# Patient Record
Sex: FEMALE | Race: WHITE | NOT HISPANIC OR LATINO | Employment: OTHER | URBAN - METROPOLITAN AREA
[De-identification: names, ages, dates, MRNs, and addresses within clinical notes are randomized per-mention and may not be internally consistent; named-entity substitution may affect disease eponyms.]

---

## 2020-02-19 ENCOUNTER — OFFICE VISIT (OUTPATIENT)
Dept: URGENT CARE | Age: 75
End: 2020-02-19
Payer: COMMERCIAL

## 2020-02-19 VITALS
HEIGHT: 62 IN | TEMPERATURE: 98.1 F | SYSTOLIC BLOOD PRESSURE: 130 MMHG | DIASTOLIC BLOOD PRESSURE: 58 MMHG | RESPIRATION RATE: 18 BRPM | WEIGHT: 182.4 LBS | HEART RATE: 67 BPM | OXYGEN SATURATION: 100 % | BODY MASS INDEX: 33.57 KG/M2

## 2020-02-19 DIAGNOSIS — H57.12 LEFT EYE PAIN: Primary | ICD-10-CM

## 2020-02-19 DIAGNOSIS — H11.32 SUBCONJUNCTIVAL HEMATOMA, LEFT: ICD-10-CM

## 2020-02-19 PROCEDURE — 99203 OFFICE O/P NEW LOW 30 MIN: CPT | Performed by: NURSE PRACTITIONER

## 2020-02-19 RX ORDER — ASPIRIN 81 MG/1
81 TABLET, CHEWABLE ORAL DAILY
COMMUNITY

## 2020-02-19 RX ORDER — TOBRAMYCIN 3 MG/ML
1 SOLUTION/ DROPS OPHTHALMIC
Qty: 1 BOTTLE | Refills: 0 | Status: SHIPPED | OUTPATIENT
Start: 2020-02-19 | End: 2020-02-26

## 2020-02-19 RX ORDER — NAPROXEN SODIUM 220 MG
TABLET ORAL
COMMUNITY

## 2020-02-19 NOTE — PATIENT INSTRUCTIONS
Take meds as directed   If worse go to the ER or see your eye dr        Subconjunctival Hemorrhage   WHAT YOU NEED TO KNOW:   A subconjunctival hemorrhage is when blood collects under the conjunctiva in your eye  The conjunctiva is the clear lining that covers the white part of your eye  The blood comes from broken blood vessels under the conjunctiva  DISCHARGE INSTRUCTIONS:   Care for your eye:   · Cold or warm compress:  Use a cold pack during the first 24 hours  Ask how often to apply it and for how long each time  After the first 24 hours, apply a warm pack on your eye  Do this 3 times each day for about 10 to 15 minutes each time  · Eyedrops: You may need artificial tears to keep your eye moist  Use the drops as directed  Follow up with your healthcare provider or eye specialist as directed:  Write down your questions so you remember to ask them during your visits  Contact your healthcare provider or eye specialist if:   · The redness in your eye has not gone away after 3 weeks  · You have another subconjunctival hemorrhage  · You have subconjunctival hemorrhages in both eyes  · You have questions or concerns about your condition or care  Return to the emergency department if:   · You have eye pain or sensitivity to light  · Your vision changes  · You have white or yellow discharge from your eye  © 2017 [facility] [facility] Information is for End User's use only and may not be sold, redistributed or otherwise used for commercial purposes  All illustrations and images included in CareNotes® are the copyrighted property of [employer]  or [employer]  The above information is an  only  It is not intended as medical advice for individual conditions or treatments  Talk to your doctor, nurse or pharmacist before following any medical regimen to see if it is safe and effective for you

## 2020-02-19 NOTE — PROGRESS NOTES
NAME: Ainsley Sacks is a 66 y o  female  : 1945    MRN: [id number]    /58   Pulse 67   Temp 98 1 °F (36 7 °C) (Tympanic)   Resp 18   Ht 5' 2" (1 575 m)   Wt 82 7 kg (182 lb 6 4 oz)   SpO2 100%   BMI 33 36 kg/m²     Assessment and Plan   Left eye pain [H57 12]  1  Left eye pain  tobramycin (TOBREX) 0 3 % SOLN   2  Subconjunctival hematoma, left  tobramycin (TOBREX) 0 3 % SOLN       Park Davenport was seen today for eye problem  Diagnoses and all orders for this visit:    Left eye pain  -     tobramycin (TOBREX) 0 3 % SOLN; Administer 1 drop to the right eye every 4 (four) hours while awake for 7 days    Subconjunctival hematoma, left  -     tobramycin (TOBREX) 0 3 % SOLN; Administer 1 drop to the right eye every 4 (four) hours while awake for 7 days        Patient Instructions   Patient Instructions     Take meds as directed   If worse go to the ER or see your eye dr        Subconjunctival Hemorrhage   WHAT YOU NEED TO KNOW:   A subconjunctival hemorrhage is when blood collects under the conjunctiva in your eye  The conjunctiva is the clear lining that covers the white part of your eye  The blood comes from broken blood vessels under the conjunctiva  DISCHARGE INSTRUCTIONS:   Care for your eye:   · Cold or warm compress:  Use a cold pack during the first 24 hours  Ask how often to apply it and for how long each time  After the first 24 hours, apply a warm pack on your eye  Do this 3 times each day for about 10 to 15 minutes each time  · Eyedrops: You may need artificial tears to keep your eye moist  Use the drops as directed  Follow up with your healthcare provider or eye specialist as directed:  Write down your questions so you remember to ask them during your visits  Contact your healthcare provider or eye specialist if:   · The redness in your eye has not gone away after 3 weeks  · You have another subconjunctival hemorrhage      · You have subconjunctival hemorrhages in both eyes     · You have questions or concerns about your condition or care  Return to the emergency department if:   · You have eye pain or sensitivity to light  · Your vision changes  · You have white or yellow discharge from your eye  © 2017 [address] [employer] Information is for End User's use only and may not be sold, redistributed or otherwise used for commercial purposes  All illustrations and images included in CareNotes® are the copyrighted property of [employer]  or [employer]  The above information is an  only  It is not intended as medical advice for individual conditions or treatments  Talk to your doctor, nurse or pharmacist before following any medical regimen to see if it is safe and effective for you  Proceed to ER if symptoms worsen  Chief Complaint     Chief Complaint   Patient presents with    Eye Problem     Left eye symptoms present for 48 hours, presents with scleral erythema and periorcular edema, states she had increased drainage that was white in color  Crusted when she woke up this AM   States it is not painful just irrirated  History of Present Illness     66-year-old female here today with her  at bedside  She comes in today with 2 days of left eye pain erythema to the sclera and watery eye  Patient recently moved to the area on Sunday from [facility] and was exposed to a lot of dust   Her left eye is itchy in nature and has been sent rubbing it for the past few days  Today she woke up without crusted shut due to the amount of drainage from the eye  Patient has had lens implants over 12 years ago and has had issues since  Review of Systems   Review of Systems   Eyes: Positive for discharge, redness and itching  Negative for photophobia and visual disturbance  Respiratory: Positive for cough  Neurological: Negative for dizziness, syncope and headaches           Current Medications       Current Outpatient Medications:     aspirin 81 mg chewable tablet, Chew 81 mg daily, Disp: , Rfl:     naproxen sodium (Aleve) 220 MG tablet, Take by mouth, Disp: , Rfl:     tobramycin (TOBREX) 0 3 % SOLN, Administer 1 drop to the right eye every 4 (four) hours while awake for 7 days, Disp: 1 Bottle, Rfl: 0    Current Allergies     Allergies as of 02/19/2020 - Reviewed 02/19/2020   Allergen Reaction Noted    Azithromycin  09/21/2015    Diclofenac  09/21/2015    Morphine  09/21/2015    Penicillins  09/21/2015    Sulfa antibiotics  09/21/2015    Warfarin  09/21/2015    Vancomycin Rash 02/19/2020              Past Medical History:   Diagnosis Date    Atrial fibrillation ([facility] [address] )     watchman placement       Past Surgical History:   Procedure Laterality Date    CATARACT EXTRACTION, BILATERAL      EYE SURGERY      Left eye S/P Yag laser, right eye lens implant    HYSTERECTOMY      JOINT REPLACEMENT      left hip, right knee       History reviewed  No pertinent family history  Medications have been verified  The following portions of the patient's history were reviewed and updated as appropriate: allergies, current medications, past family history, past medical history, past social history, past surgical history and problem list     Objective   /58   Pulse 67   Temp 98 1 °F (36 7 °C) (Tympanic)   Resp 18   Ht 5' 2" (1 575 m)   Wt 82 7 kg (182 lb 6 4 oz)   SpO2 100%   BMI 33 36 kg/m²      Physical Exam     Physical Exam   Constitutional: She appears well-developed and well-nourished  No distress  Eyes: Pupils are equal, round, and reactive to light  EOM and lids are normal  Left conjunctiva has a hemorrhage             MANJU Edwards

## 2020-03-02 ENCOUNTER — OFFICE VISIT (OUTPATIENT)
Dept: URGENT CARE | Age: 75
End: 2020-03-02
Payer: COMMERCIAL

## 2020-03-02 VITALS
HEIGHT: 62 IN | RESPIRATION RATE: 18 BRPM | TEMPERATURE: 98 F | BODY MASS INDEX: 33.45 KG/M2 | DIASTOLIC BLOOD PRESSURE: 86 MMHG | HEART RATE: 78 BPM | WEIGHT: 181.8 LBS | OXYGEN SATURATION: 100 % | SYSTOLIC BLOOD PRESSURE: 142 MMHG

## 2020-03-02 DIAGNOSIS — J01.00 ACUTE NON-RECURRENT MAXILLARY SINUSITIS: Primary | ICD-10-CM

## 2020-03-02 PROCEDURE — 99213 OFFICE O/P EST LOW 20 MIN: CPT | Performed by: PHYSICIAN ASSISTANT

## 2020-03-02 RX ORDER — CLOPIDOGREL BISULFATE 75 MG/1
75 TABLET ORAL DAILY
COMMUNITY
End: 2020-04-09 | Stop reason: ALTCHOICE

## 2020-03-02 RX ORDER — CHLORAL HYDRATE 500 MG
1000 CAPSULE ORAL DAILY
COMMUNITY

## 2020-03-02 RX ORDER — CIPROFLOXACIN 500 MG/1
500 TABLET, FILM COATED ORAL EVERY 12 HOURS SCHEDULED
Qty: 14 TABLET | Refills: 0 | Status: SHIPPED | OUTPATIENT
Start: 2020-03-02 | End: 2020-03-09

## 2020-03-02 RX ORDER — DIPHENHYDRAMINE HCL 25 MG
25 CAPSULE ORAL EVERY 6 HOURS PRN
COMMUNITY

## 2020-03-02 NOTE — PATIENT INSTRUCTIONS
Take antibiotic as directed until completed  Follow up with PCP in 3-5 days  Proceed to  ER if symptoms worse    Sinusitis   AMBULATORY CARE:   Sinusitis  is inflammation or infection of your sinuses  It is most often caused by a virus  Acute sinusitis may last up to 12 weeks  Chronic sinusitis lasts longer than 12 weeks  Recurrent sinusitis means you have 4 or more times in 1 year  Common symptoms include the following:   · Fever    · Pain, pressure, redness, or swelling around the forehead, cheeks, or eyes    · Thick yellow or green discharge from your nose    · Tenderness when you touch your face over your sinuses    · Dry cough that happens mostly at night or when you lie down    · Headache and face pain that is worse when you lean forward    · Tooth pain, or pain when you chew  Seek care immediately if:   · Your eye and eyelid are red, swollen, and painful  · You cannot open your eye  · You have vision changes, such as double vision  · Your eyeball bulges out or you cannot move your eye  · You are more sleepy than normal, or you notice changes in your ability to think, move, or talk  · You have a stiff neck, a fever, or a bad headache  · You have swelling of your forehead or scalp  Contact your healthcare provider if:   · Your symptoms do not improve after 3 days  · Your symptoms do not go away after 10 days  · You have nausea and are vomiting  · Your nose is bleeding  · You have questions or concerns about your condition or care  Treatment for sinusitis:  Your symptoms may go away on their own  Your healthcare provider may recommend watchful waiting for up to 10 days before starting antibiotics  You may  need any of the following:  · Acetaminophen  decreases pain and fever  It is available without a doctor's order  Ask how much to take and how often to take it  Follow directions   Read the labels of all other medicines you are using to see if they also contain acetaminophen, or ask your doctor or pharmacist  Acetaminophen can cause liver damage if not taken correctly  Do not use more than 4 grams (4,000 milligrams) total of acetaminophen in one day  · NSAIDs , such as ibuprofen, help decrease swelling, pain, and fever  This medicine is available with or without a doctor's order  NSAIDs can cause stomach bleeding or kidney problems in certain people  If you take blood thinner medicine, always ask your healthcare provider if NSAIDs are safe for you  Always read the medicine label and follow directions  · Nasal steroid sprays  may help decrease inflammation in your nose and sinuses  · Decongestants  help reduce swelling and drain mucus in the nose and sinuses  They may help you breathe easier  · Antihistamines  help dry mucus in the nose and relieve sneezing  · Antibiotics  help treat or prevent a bacterial infection  · Take your medicine as directed  Contact your healthcare provider if you think your medicine is not helping or if you have side effects  Tell him or her if you are allergic to any medicine  Keep a list of the medicines, vitamins, and herbs you take  Include the amounts, and when and why you take them  Bring the list or the pill bottles to follow-up visits  Carry your medicine list with you in case of an emergency  Self-care:   · Rinse your sinuses  Use a sinus rinse device to rinse your nasal passages with a saline (salt water) solution or distilled water  Do not use tap water  This will help thin the mucus in your nose and rinse away pollen and dirt  It will also help reduce swelling so you can breathe normally  Ask your healthcare provider how often to do this  · Breathe in steam   Heat a bowl of water until you see steam  Lean over the bowl and make a tent over your head with a large towel  Breathe deeply for about 20 minutes  Be careful not to get too close to the steam or burn yourself  Do this 3 times a day   You can also breathe deeply when you take a hot shower  · Sleep with your head elevated  Place an extra pillow under your head before you go to sleep to help your sinuses drain  · Drink liquids as directed  Ask your healthcare provider how much liquid to drink each day and which liquids are best for you  Liquids will thin the mucus in your nose and help it drain  Avoid drinks that contain alcohol or caffeine  · Do not smoke, and avoid secondhand smoke  Nicotine and other chemicals in cigarettes and cigars can make your symptoms worse  Ask your healthcare provider for information if you currently smoke and need help to quit  E-cigarettes or smokeless tobacco still contain nicotine  Talk to your healthcare provider before you use these products  Prevent the spread of germs that cause sinusitis:  Wash your hands often with soap and water  Wash your hands after you use the bathroom, change a child's diaper, or sneeze  Wash your hands before you prepare or eat food  Follow up with your healthcare provider as directed: You may be referred to an ear, nose, and throat specialist  Write down your questions so you remember to ask them during your visits  © 2017 [address] [employer]  Information is for End User's use only and may not be sold, redistributed or otherwise used for commercial purposes  All illustrations and images included in CareNotes® are the copyrighted property of [employer]  or [employer]  The above information is an  only  It is not intended as medical advice for individual conditions or treatments  Talk to your doctor, nurse or pharmacist before following any medical regimen to see if it is safe and effective for you

## 2020-03-02 NOTE — PROGRESS NOTES
[facility]        NAME: Nilson Razo is a 66 y o  female  : 1945    MRN: [id number]  DATE: 2020  TIME: 6:41 PM    Assessment and Plan   Acute non-recurrent maxillary sinusitis [J01 00]  1  Acute non-recurrent maxillary sinusitis  ciprofloxacin (CIPRO) 500 mg tablet         Patient Instructions     Take antibiotic as directed until completed  Follow up with PCP in 3-5 days  Proceed to  ER if symptoms worsen  Chief Complaint     Chief Complaint   Patient presents with    Nasal Congestion     symptoms started about   pt has a cough, cold like symptoms and ear pressure  History of Present Illness       66-year-old female presents with sinus pressure and fullness in right ear  Symptoms started  and has been waxing waning since then  Denies any fevers or chills  Has had a mild cough from time to time  Denies any rashes  No chest pain shortness of breath  No abdominal pain nausea vomiting or diarrhea  Sinusitis   This is a new problem  The current episode started 1 to 4 weeks ago  The problem has been waxing and waning since onset  There has been no fever  The pain is moderate  Associated symptoms include congestion, coughing, ear pain and sinus pressure  Pertinent negatives include no hoarse voice, neck pain or sneezing  Past treatments include nothing  The treatment provided no relief  Review of Systems   Review of Systems   Constitutional: Negative  HENT: Positive for congestion, ear pain, rhinorrhea, sinus pressure and sinus pain  Negative for hoarse voice and sneezing  Respiratory: Positive for cough  Cardiovascular: Negative  Gastrointestinal: Negative  Musculoskeletal: Negative  Negative for neck pain  Skin: Negative  Neurological: Negative            Current Medications       Current Outpatient Medications:     clopidogrel (PLAVIX) 75 mg tablet, Take 75 mg by mouth daily, Disp: , Rfl:     diphenhydrAMINE (BENADRYL) 25 mg capsule, Take 25 mg by mouth every 6 (six) hours as needed for itching, Disp: , Rfl:     Omega-3 Fatty Acids (FISH OIL) 1,000 mg, Take 1,000 mg by mouth daily, Disp: , Rfl:     aspirin 81 mg chewable tablet, Chew 81 mg daily, Disp: , Rfl:     ciprofloxacin (CIPRO) 500 mg tablet, Take 1 tablet (500 mg total) by mouth every 12 (twelve) hours for 7 days, Disp: 14 tablet, Rfl: 0    naproxen sodium (Aleve) 220 MG tablet, Take by mouth, Disp: , Rfl:     Current Allergies     Allergies as of 03/02/2020 - Reviewed 03/02/2020   Allergen Reaction Noted    Azithromycin  09/21/2015    Diclofenac  09/21/2015    Morphine  09/21/2015    Penicillins  09/21/2015    Sulfa antibiotics  09/21/2015    Warfarin  09/21/2015    Vancomycin Rash 02/19/2020            The following portions of the patient's history were reviewed and updated as appropriate: allergies, current medications, past family history, past medical history, past social history, past surgical history and problem list      Past Medical History:   Diagnosis Date    Atrial fibrillation ([address] )     watchman placement       Past Surgical History:   Procedure Laterality Date    CATARACT EXTRACTION, BILATERAL      EYE SURGERY      Left eye S/P Yag laser, right eye lens implant    HYSTERECTOMY      JOINT REPLACEMENT      left hip, right knee       History reviewed  No pertinent family history  Medications have been verified  Objective   /86   Pulse 78   Temp 98 °F (36 7 °C) (Tympanic)   Resp 18   Ht 5' 2" (1 575 m)   Wt 82 5 kg (181 lb 12 8 oz)   SpO2 100%   BMI 33 25 kg/m²        Physical Exam     Physical Exam   Constitutional: She is oriented to person, place, and time  She appears well-developed and well-nourished  No distress  HENT:   Head: Normocephalic and atraumatic     Right Ear: Hearing, tympanic membrane, external ear and ear canal normal    Left Ear: Hearing, tympanic membrane, external ear and ear canal normal    Nose: Right sinus exhibits maxillary sinus tenderness  Mouth/Throat: Uvula is midline, oropharynx is clear and moist and mucous membranes are normal  No oropharyngeal exudate  Eyes: Conjunctivae and EOM are normal  Right eye exhibits no discharge  Left eye exhibits no discharge  Neck: Normal range of motion  Neck supple  Cardiovascular: Normal rate, regular rhythm, normal heart sounds and intact distal pulses  No murmur heard  Pulmonary/Chest: Effort normal and breath sounds normal  No respiratory distress  She has no wheezes  She has no rales  Abdominal: Soft  Bowel sounds are normal  There is no tenderness  Musculoskeletal: Normal range of motion  Lymphadenopathy:     She has no cervical adenopathy  Neurological: She is alert and oriented to person, place, and time  Skin: Skin is warm and dry  Psychiatric: She has a normal mood and affect  Nursing note and vitals reviewed

## 2020-04-09 ENCOUNTER — OFFICE VISIT (OUTPATIENT)
Dept: URGENT CARE | Age: 75
End: 2020-04-09
Payer: COMMERCIAL

## 2020-04-09 VITALS
HEIGHT: 62 IN | SYSTOLIC BLOOD PRESSURE: 132 MMHG | WEIGHT: 178 LBS | BODY MASS INDEX: 32.76 KG/M2 | RESPIRATION RATE: 18 BRPM | OXYGEN SATURATION: 100 % | HEART RATE: 78 BPM | TEMPERATURE: 97.4 F | DIASTOLIC BLOOD PRESSURE: 60 MMHG

## 2020-04-09 DIAGNOSIS — J34.89 SINUS PRESSURE: ICD-10-CM

## 2020-04-09 DIAGNOSIS — J32.9 VIRAL SINUSITIS: Primary | ICD-10-CM

## 2020-04-09 DIAGNOSIS — H57.89 EYE REDNESS: ICD-10-CM

## 2020-04-09 DIAGNOSIS — B97.89 VIRAL SINUSITIS: Primary | ICD-10-CM

## 2020-04-09 DIAGNOSIS — H10.31 ACUTE CONJUNCTIVITIS OF RIGHT EYE, UNSPECIFIED ACUTE CONJUNCTIVITIS TYPE: ICD-10-CM

## 2020-04-09 PROCEDURE — 99213 OFFICE O/P EST LOW 20 MIN: CPT | Performed by: NURSE PRACTITIONER

## 2020-04-09 RX ORDER — METHYLPREDNISOLONE 4 MG/1
TABLET ORAL
Qty: 21 TABLET | Refills: 0 | Status: SHIPPED | OUTPATIENT
Start: 2020-04-09

## 2020-04-09 RX ORDER — TOBRAMYCIN 3 MG/ML
1 SOLUTION/ DROPS OPHTHALMIC
Qty: 1 BOTTLE | Refills: 0 | Status: SHIPPED | OUTPATIENT
Start: 2020-04-09 | End: 2020-04-16

## 2024-04-15 ENCOUNTER — OFFICE VISIT (OUTPATIENT)
Dept: URGENT CARE | Age: 79
End: 2024-04-15
Payer: COMMERCIAL

## 2024-04-15 VITALS
DIASTOLIC BLOOD PRESSURE: 70 MMHG | SYSTOLIC BLOOD PRESSURE: 151 MMHG | RESPIRATION RATE: 18 BRPM | OXYGEN SATURATION: 100 % | TEMPERATURE: 97.2 F | HEART RATE: 62 BPM

## 2024-04-15 DIAGNOSIS — J01.40 ACUTE PANSINUSITIS, RECURRENCE NOT SPECIFIED: Primary | ICD-10-CM

## 2024-04-15 PROCEDURE — 99213 OFFICE O/P EST LOW 20 MIN: CPT | Performed by: EMERGENCY MEDICINE

## 2024-04-15 RX ORDER — DOXYCYCLINE 100 MG/1
100 TABLET ORAL 2 TIMES DAILY
Qty: 14 TABLET | Refills: 0 | Status: SHIPPED | OUTPATIENT
Start: 2024-04-15 | End: 2024-04-22

## 2024-04-15 RX ORDER — FAMOTIDINE 20 MG/1
20 TABLET, FILM COATED ORAL
COMMUNITY
Start: 2023-11-21

## 2024-04-15 RX ORDER — MECLIZINE HCL 12.5 MG/1
TABLET ORAL 3 TIMES DAILY PRN
COMMUNITY

## 2024-04-15 RX ORDER — FLUTICASONE PROPIONATE 50 MCG
2 SPRAY, SUSPENSION (ML) NASAL DAILY
Qty: 15.8 ML | Refills: 3 | Status: SHIPPED | OUTPATIENT
Start: 2024-04-15 | End: 2024-05-15

## 2024-04-15 NOTE — PROGRESS NOTES
[facility]        NAME: Marian Fernandez is a 79 y.o. female  : 1945    MRN: [id number]  DATE: April 15, 2024  TIME: 6:49 PM    Assessment and Plan   Acute pansinusitis, recurrence not specified [J01.40]  1. Acute pansinusitis, recurrence not specified  doxycycline (ADOXA) 100 MG tablet    fluticasone (FLONASE) 50 mcg/act nasal spray            Patient Instructions       Follow up with PCP in 3-5 days.  Proceed to  ER if symptoms worsen.    If tests have been performed at [facility], our office will contact you with results if changes need to be made to the care plan discussed with you at the visit.  [facility]    Chief Complaint     Chief Complaint   Patient presents with    Sinus Problem      Sinus pressure, right ear pain. Sx for 5 days. Nothing OTC. Using NediPot         History of Present Illness       79-year-old female with history of A-fib status post Watchman procedure, GERD presents with chief complaint of worsening, right greater than left maxillary sinus pressure with onset of 5 days ago with associated mucopurulent nasal discharge.  Patient denies associated headaches, visual changes, speech changes, fevers or focal numbness tingling or weakness in her extremities.  Has tried utilizing a [place] pot without relief.    Sinus Problem  This is a new problem. The current episode started in the past 7 days. The problem has been gradually worsening since onset. There has been no fever. Her pain is at a severity of 5/10. The pain is moderate. Associated symptoms include congestion, coughing, sinus pressure, sneezing and a sore throat. Pertinent negatives include no chills, diaphoresis, ear pain, headaches, hoarse voice, neck pain, shortness of breath or swollen glands.       Review of Systems   Review of Systems   Constitutional:  Negative for activity change, appetite change, chills, diaphoresis, fatigue and fever.   HENT:  Positive for congestion,  postnasal drip, rhinorrhea, sinus pressure, sinus pain, sneezing and sore throat. Negative for dental problem, drooling, ear discharge, ear pain, facial swelling, hearing loss, hoarse voice, mouth sores, nosebleeds, tinnitus, trouble swallowing and voice change.    Eyes:  Negative for pain and visual disturbance.   Respiratory:  Positive for cough. Negative for apnea, choking, chest tightness, shortness of breath, wheezing and stridor.    Cardiovascular:  Negative for chest pain, palpitations and leg swelling.   Gastrointestinal:  Negative for abdominal distention, abdominal pain, anal bleeding, blood in stool, constipation, diarrhea, nausea, rectal pain and vomiting.   Genitourinary:  Negative for dysuria, hematuria and vaginal pain.   Musculoskeletal:  Negative for arthralgias, back pain, gait problem, joint swelling, myalgias, neck pain and neck stiffness.   Skin:  Negative for color change and rash.   Neurological:  Negative for dizziness, tremors, seizures, syncope, facial asymmetry, speech difficulty, weakness, light-headedness, numbness and headaches.   All other systems reviewed and are negative.        Current Medications       Current Outpatient Medications:     aspirin 81 mg chewable tablet, Chew 81 mg daily, Disp: , Rfl:     diphenhydrAMINE (BENADRYL) 25 mg capsule, Take 25 mg by mouth every 6 (six) hours as needed for itching, Disp: , Rfl:     doxycycline (ADOXA) 100 MG tablet, Take 1 tablet (100 mg total) by mouth 2 (two) times a day for 7 days, Disp: 14 tablet, Rfl: 0    famotidine (PEPCID) 20 mg tablet, Take 20 mg by mouth, Disp: , Rfl:     fluticasone (FLONASE) 50 mcg/act nasal spray, 2 sprays into each nostril daily, Disp: 15.8 mL, Rfl: 3    meclizine (ANTIVERT) 12.5 MG tablet, Take by mouth 3 (three) times a day as needed for dizziness, Disp: , Rfl:     metoprolol tartrate (LOPRESSOR) 25 mg tablet, Take 25 mg by mouth every 12 (twelve) hours PRN, Disp: , Rfl:     naproxen sodium (Aleve) 220 MG  tablet, Take by mouth, Disp: , Rfl:     Omega-3 Fatty Acids (FISH OIL) 1,000 mg, Take 1,000 mg by mouth daily, Disp: , Rfl:     methylPREDNISolone 4 MG tablet therapy pack, Use as directed on package (Patient not taking: Reported on 4/15/2024), Disp: 21 tablet, Rfl: 0    Current Allergies     Allergies as of 04/15/2024 - Reviewed 04/15/2024   Allergen Reaction Noted    Azithromycin  09/21/2015    Diclofenac  09/21/2015    Morphine  09/21/2015    Penicillins  09/21/2015    Sulfa antibiotics  09/21/2015    Warfarin  09/21/2015    Vancomycin Rash 02/19/2020            The following portions of the patient's history were reviewed and updated as appropriate: allergies, current medications, past family history, past medical history, past social history, past surgical history and problem list.     Past Medical History:   Diagnosis Date    Atrial fibrillation (HCC)     watchman placement       Past Surgical History:   Procedure Laterality Date    CATARACT EXTRACTION, BILATERAL      EYE SURGERY      Left eye S/P Yag laser, right eye lens implant    HYSTERECTOMY      JOINT REPLACEMENT      left hip, right knee       History reviewed. No pertinent family history.      Medications have been verified.        Objective   /70   Pulse 62   Temp (!) 97.2 °F (36.2 °C) (Tympanic)   Resp 18   SpO2 100%   No LMP recorded. Patient is postmenopausal.       Physical Exam     Physical Exam  Vitals and nursing note reviewed.   Constitutional:       General: She is not in acute distress.     Appearance: Normal appearance. She is normal weight. She is not ill-appearing, toxic-appearing or diaphoretic.   HENT:      Head: Normocephalic and atraumatic.      Right Ear: Tympanic membrane, ear canal and external ear normal. There is no impacted cerumen.      Left Ear: Tympanic membrane, ear canal and external ear normal. There is no impacted cerumen.      Nose: Nose normal. No nasal tenderness.      Right Nostril: No foreign body,  epistaxis, septal hematoma or occlusion.      Left Nostril: No foreign body, epistaxis, septal hematoma or occlusion.      Right Turbinates: Enlarged and swollen.      Left Turbinates: Enlarged and swollen.      Mouth/Throat:      Mouth: Mucous membranes are moist.      Pharynx: No oropharyngeal exudate or posterior oropharyngeal erythema.   Eyes:      General: No scleral icterus.        Right eye: No discharge.         Left eye: No discharge.      Extraocular Movements: Extraocular movements intact.      Conjunctiva/sclera: Conjunctivae normal.      Pupils: Pupils are equal, round, and reactive to light.   Cardiovascular:      Rate and Rhythm: Normal rate and regular rhythm.      Pulses: Normal pulses.           Carotid pulses are 2+ on the right side and 2+ on the left side.       Radial pulses are 2+ on the right side and 2+ on the left side.      Heart sounds: Normal heart sounds. No murmur heard.     No friction rub. No gallop.   Pulmonary:      Effort: Pulmonary effort is normal. No respiratory distress.      Breath sounds: Normal breath sounds. No stridor. No wheezing, rhonchi or rales.   Chest:      Chest wall: No tenderness.   Abdominal:      General: Abdomen is flat. There is no distension.      Palpations: Abdomen is soft. There is no mass.      Tenderness: There is no abdominal tenderness. There is no right CVA tenderness, left CVA tenderness, guarding or rebound.      Hernia: No hernia is present.   Musculoskeletal:         General: No swelling, tenderness, deformity or signs of injury.      Cervical back: Normal range of motion and neck supple.      Right lower leg: No edema.      Left lower leg: No edema.   Skin:     General: Skin is warm and dry.      Coloration: Skin is not jaundiced or pale.      Findings: No bruising, erythema, lesion or rash.   Neurological:      General: No focal deficit present.      Mental Status: She is alert and oriented to person, place, and time.      Cranial Nerves: No  cranial nerve deficit.      Sensory: No sensory deficit.      Motor: No weakness.      Coordination: Coordination normal.      Gait: Gait normal.   Psychiatric:         Mood and Affect: Mood normal.         Behavior: Behavior normal.

## 2024-04-27 ENCOUNTER — OFFICE VISIT (OUTPATIENT)
Dept: URGENT CARE | Age: 79
End: 2024-04-27
Payer: COMMERCIAL

## 2024-04-27 VITALS
RESPIRATION RATE: 18 BRPM | BODY MASS INDEX: 31.28 KG/M2 | DIASTOLIC BLOOD PRESSURE: 65 MMHG | SYSTOLIC BLOOD PRESSURE: 147 MMHG | HEIGHT: 62 IN | WEIGHT: 170 LBS | TEMPERATURE: 97.9 F | OXYGEN SATURATION: 99 % | HEART RATE: 60 BPM

## 2024-04-27 DIAGNOSIS — J32.9 VIRAL SINUSITIS: Primary | ICD-10-CM

## 2024-04-27 DIAGNOSIS — B97.89 VIRAL SINUSITIS: Primary | ICD-10-CM

## 2024-04-27 DIAGNOSIS — H69.91 DISORDER OF RIGHT EUSTACHIAN TUBE: ICD-10-CM

## 2024-04-27 PROCEDURE — 99213 OFFICE O/P EST LOW 20 MIN: CPT | Performed by: NURSE PRACTITIONER

## 2024-04-27 RX ORDER — METHYLPREDNISOLONE 4 MG/1
TABLET ORAL
Qty: 21 TABLET | Refills: 0 | Status: SHIPPED | OUTPATIENT
Start: 2024-04-27

## 2024-04-27 NOTE — PROGRESS NOTES
"NAME: Marian Fernandez is a 79 y.o. female  : 1945    MRN: [id number]    /65   Pulse 60   Temp 97.9 °F (36.6 °C)   Resp 18   Ht 5' 2\" (1.575 m)   Wt 77.1 kg (170 lb)   SpO2 99%   BMI 31.09 kg/m²     11:10 AM    Assessment and Plan   Viral sinusitis [J32.9, B97.89]  1. Viral sinusitis  methylPREDNISolone 4 MG tablet therapy pack      2. Disorder of right eustachian tube  methylPREDNISolone 4 MG tablet therapy pack          Marian was seen today for nasal congestion.    Diagnoses and all orders for this visit:    Viral sinusitis  -     methylPREDNISolone 4 MG tablet therapy pack; Use as directed on package    Disorder of right eustachian tube  -     methylPREDNISolone 4 MG tablet therapy pack; Use as directed on package        Patient Instructions     Patient Instructions   Take meds as directed.     Take zyrtec or allegra daily  Use flonase 1-2 sprays in each nare daily   Use nasal saline to the nose,   Use humidifer in room  Symptoms worsen go to ER  Rest  Dont so sinus rinses for a few weeks.       Proceed to the nearest ER if symptoms worsen, Follow up with your PCP  Continue to social distance, wash your hands, and wear your masks. Please continue to follow the CDC.gov guidelines daily for they are subject to change on COVID-19    Chief Complaint     Chief Complaint   Patient presents with    Nasal Congestion     Nasal congestion, sinus pressure/pain x 3 weeks         History of Present Illness     79-year-old female here today with right-sided pressure near the ear and maxillary sinus.  Patient has no fevers was recently diagnosed with sinus infection and was prescribed doxycycline which she did finish.  Patient has no other complaints denies fever denies cough denies chest pain or postnasal drip.  No pain to the left nasal or sinus no headaches.  Patient will be rechecking patient needed more antibiotics for the pain on the right side is bothersome.  She has been doing the [place] pot.  She " takes Benadryl prior to bed.            Review of Systems   Review of Systems   Constitutional:  Negative for activity change, appetite change, chills, fatigue and fever.   HENT:  Positive for congestion, ear pain, sinus pressure and sinus pain. Negative for ear discharge, postnasal drip, rhinorrhea, sneezing and sore throat.    Eyes:  Negative for pain.   Respiratory:  Negative for cough, chest tightness, shortness of breath and wheezing.    Cardiovascular: Negative.    Gastrointestinal:  Negative for nausea.   Genitourinary: Negative.    Musculoskeletal: Negative.  Negative for neck pain.   Skin: Negative.    Allergic/Immunologic: Environmental allergies: Sinus.   Neurological:  Negative for headaches.   Psychiatric/Behavioral: Negative.           Current Medications       Current Outpatient Medications:     aspirin 81 mg chewable tablet, Chew 81 mg daily, Disp: , Rfl:     diphenhydrAMINE (BENADRYL) 25 mg capsule, Take 25 mg by mouth every 6 (six) hours as needed for itching, Disp: , Rfl:     famotidine (PEPCID) 20 mg tablet, Take 20 mg by mouth, Disp: , Rfl:     fluticasone (FLONASE) 50 mcg/act nasal spray, 2 sprays into each nostril daily, Disp: 15.8 mL, Rfl: 3    meclizine (ANTIVERT) 12.5 MG tablet, Take by mouth 3 (three) times a day as needed for dizziness, Disp: , Rfl:     methylPREDNISolone 4 MG tablet therapy pack, Use as directed on package, Disp: 21 tablet, Rfl: 0    metoprolol tartrate (LOPRESSOR) 25 mg tablet, Take 25 mg by mouth every 12 (twelve) hours PRN, Disp: , Rfl:     naproxen sodium (Aleve) 220 MG tablet, Take by mouth, Disp: , Rfl:     Omega-3 Fatty Acids (FISH OIL) 1,000 mg, Take 1,000 mg by mouth daily (Patient not taking: Reported on 4/27/2024), Disp: , Rfl:     Current Allergies     Allergies as of 04/27/2024 - Reviewed 04/27/2024   Allergen Reaction Noted    Azithromycin  09/21/2015    Diclofenac  09/21/2015    Morphine  09/21/2015    Penicillins  09/21/2015    Sulfa antibiotics   "09/21/2015    Warfarin  09/21/2015    Vancomycin Rash 02/19/2020              Past Medical History:   Diagnosis Date    Atrial fibrillation (HCC)     watchman placement       Past Surgical History:   Procedure Laterality Date    CATARACT EXTRACTION, BILATERAL      EYE SURGERY      Left eye S/P Yag laser, right eye lens implant    HYSTERECTOMY      JOINT REPLACEMENT      left hip, right knee       History reviewed. No pertinent family history.      Medications have been verified.    The following portions of the patient's history were reviewed and updated as appropriate: allergies, current medications, past family history, past medical history, past social history, past surgical history and problem list.    Objective   /65   Pulse 60   Temp 97.9 °F (36.6 °C)   Resp 18   Ht 5' 2\" (1.575 m)   Wt 77.1 kg (170 lb)   SpO2 99%   BMI 31.09 kg/m²      Physical Exam     Physical Exam  Constitutional:       General: She is not in acute distress.     Appearance: Normal appearance. She is not ill-appearing.   HENT:      Head: Normocephalic.      Right Ear: Tympanic membrane, ear canal and external ear normal. There is no impacted cerumen.      Left Ear: Tympanic membrane, ear canal and external ear normal.      Nose: Nose normal. No congestion or rhinorrhea.      Mouth/Throat:      Mouth: Mucous membranes are moist.      Pharynx: No oropharyngeal exudate or posterior oropharyngeal erythema.   Eyes:      Pupils: Pupils are equal, round, and reactive to light.   Cardiovascular:      Rate and Rhythm: Normal rate and regular rhythm.      Pulses: Normal pulses.      Heart sounds: No murmur heard.     No friction rub.   Pulmonary:      Effort: Pulmonary effort is normal.   Musculoskeletal:      Cervical back: Normal range of motion.   Neurological:      Mental Status: She is alert.               Note: Portions of this record may have been created with voice recognition software. Occasional wrong word or \"sound a like\" " substitutions may have occurred due to the inherent limitations of voice recognition software. Please read the chart carefully and recognize, using context, where substitutions have occurred.*    MANJU Kaur

## 2024-04-27 NOTE — PATIENT INSTRUCTIONS
Take meds as directed.     Take zyrtec or allegra daily  Use flonase 1-2 sprays in each nare daily   Use nasal saline to the nose,   Use humidifer in room  Symptoms worsen go to ER  Rest  Dont so sinus rinses for a few weeks.